# Patient Record
Sex: FEMALE | Race: WHITE | NOT HISPANIC OR LATINO | URBAN - METROPOLITAN AREA
[De-identification: names, ages, dates, MRNs, and addresses within clinical notes are randomized per-mention and may not be internally consistent; named-entity substitution may affect disease eponyms.]

---

## 2023-07-25 ENCOUNTER — OUTPATIENT (OUTPATIENT)
Dept: OUTPATIENT SERVICES | Facility: HOSPITAL | Age: 68
LOS: 1 days | Discharge: HOME | End: 2023-07-25

## 2023-07-25 VITALS
HEART RATE: 86 BPM | DIASTOLIC BLOOD PRESSURE: 85 MMHG | RESPIRATION RATE: 16 BRPM | TEMPERATURE: 98 F | SYSTOLIC BLOOD PRESSURE: 140 MMHG | OXYGEN SATURATION: 98 %

## 2023-07-25 VITALS
SYSTOLIC BLOOD PRESSURE: 134 MMHG | DIASTOLIC BLOOD PRESSURE: 70 MMHG | HEART RATE: 69 BPM | TEMPERATURE: 98 F | RESPIRATION RATE: 16 BRPM

## 2023-07-25 DIAGNOSIS — M23.303 OTHER MENISCUS DERANGEMENTS, UNSPECIFIED MEDIAL MENISCUS, RIGHT KNEE: Chronic | ICD-10-CM

## 2023-07-25 DEVICE — IMPLANTABLE DEVICE: Type: IMPLANTABLE DEVICE | Site: LEFT | Status: FUNCTIONAL

## 2023-07-25 RX ORDER — ONDANSETRON 8 MG/1
4 TABLET, FILM COATED ORAL ONCE
Refills: 0 | Status: DISCONTINUED | OUTPATIENT
Start: 2023-07-25 | End: 2023-07-25

## 2023-07-25 RX ORDER — FENTANYL CITRATE 50 UG/ML
25 INJECTION INTRAVENOUS
Refills: 0 | Status: DISCONTINUED | OUTPATIENT
Start: 2023-07-25 | End: 2023-07-25

## 2023-07-25 RX ORDER — OXYCODONE AND ACETAMINOPHEN 5; 325 MG/1; MG/1
1 TABLET ORAL
Qty: 28 | Refills: 0
Start: 2023-07-25 | End: 2023-07-31

## 2023-07-25 RX ORDER — SODIUM CHLORIDE 9 MG/ML
500 INJECTION, SOLUTION INTRAVENOUS
Refills: 0 | Status: DISCONTINUED | OUTPATIENT
Start: 2023-07-25 | End: 2023-07-25

## 2023-07-25 RX ORDER — ACETAMINOPHEN 500 MG
1000 TABLET ORAL ONCE
Refills: 0 | Status: COMPLETED | OUTPATIENT
Start: 2023-07-25 | End: 2023-07-25

## 2023-07-25 RX ORDER — BUPIVACAINE AND MELOXICAM 400; 12 MG/14ML; MG/14ML
7 SOLUTION INFILTRATION ONCE
Refills: 0 | Status: DISCONTINUED | OUTPATIENT
Start: 2023-07-25 | End: 2023-07-25

## 2023-07-25 RX ADMIN — Medication 400 MILLIGRAM(S): at 13:30

## 2023-07-25 NOTE — BRIEF OPERATIVE NOTE - NSICDXBRIEFPROCEDURE_GEN_ALL_CORE_FT
PROCEDURES:  Modified Oscar bunionectomy of left foot 25-Jul-2023 11:04:47  Luiza Hatch   PROCEDURES:  Modified Oscar bunionectomy of left foot 25-Jul-2023 11:04:47  Luiza Hatch  Correction of hammertoe 25-Jul-2023 11:29:33 left 2nd digit Luiza Hatch

## 2023-07-25 NOTE — ASU DISCHARGE PLAN (ADULT/PEDIATRIC) - CARE PROVIDER_API CALL
Ham Ochoa  Podiatric Medicine and Surgery  [address], [address]  [place], [place] [address]  Phone: [phone]  Fax: [phone]  Established Patient  Follow Up Time: 1 week

## 2023-07-25 NOTE — ASU PREOP CHECKLIST - SITE MARKED BY ANESTHESIOLOGIST
[Well Developed] : well developed [Well Nourished] : well nourished [Normal] : alert, oriented as age-appropriate, affect appropriate; no weakness, no facial asymmetry, moves all extremities normal gait- child older than 18 months n/a

## 2023-07-25 NOTE — BRIEF OPERATIVE NOTE - OPERATION/FINDINGS
INCOMPLETE Left foot Oscar bunionectomy and akin osteotomy, removal of previous akin screw, 2nd digit PIPJ arthroplasty

## 2023-07-25 NOTE — ASU DISCHARGE PLAN (ADULT/PEDIATRIC) - ASU DC SPECIAL INSTRUCTIONSFT
Please keep bandage clean, dry and intact until your first post-operative visit with Dr. Ochoa. Please weight bear as tolerated to your left foot please use the cane to ambulate. Please call Dr. Ochoa's office to confirm your post operative appointment date and time.

## 2023-07-25 NOTE — BRIEF OPERATIVE NOTE - NSICDXBRIEFPOSTOP_GEN_ALL_CORE_FT
POST-OP DIAGNOSIS:  Hav (hallux abducto valgus), left 25-Jul-2023 11:05:02  Walkner, [place]   POST-OP DIAGNOSIS:  Hav (hallux abducto valgus), left 25-Jul-2023 11:05:02  WalknerLuiza of left foot 25-Jul-2023 11:30:02 2nd digit WalknerLuiza

## 2023-07-25 NOTE — BRIEF OPERATIVE NOTE - NSICDXBRIEFPREOP_GEN_ALL_CORE_FT
PRE-OP DIAGNOSIS:  Hav (hallux abducto valgus), left 25-Jul-2023 11:04:57  Walkner, [place]   PRE-OP DIAGNOSIS:  Hav (hallux abducto valgus), left 25-Jul-2023 11:04:57  WalknerLuiza of left foot 25-Jul-2023 11:29:46 2nd digit WalknerLuiza

## (undated) DEVICE — SUT VICRYL 4-0 18" P-2 UNDYED

## (undated) DEVICE — PACK ORTHO FOOT ANKLE

## (undated) DEVICE — SAW BLADE LINVATEC SAGITTAL MIC 9.5X25.5X0.4MM

## (undated) DEVICE — POSITIONER FOAM EGG CRATE ULNAR 2PCS (PINK)

## (undated) DEVICE — DRSG COBAN 3" LF NONSTERILE

## (undated) DEVICE — TOURNIQUET CUFF 18" DUAL PORT SINGLE BLADDER W PLC  (BLACK)

## (undated) DEVICE — SUT MONOCRYL 5-0 18" PS-2

## (undated) DEVICE — GLV 7.5 PROTEXIS (WHITE)

## (undated) DEVICE — MARKING PEN W RULER

## (undated) DEVICE — MICROAIRE PIN CAP 0.045" WHITE

## (undated) DEVICE — DRSG ACE BANDAGE 4"

## (undated) DEVICE — DRSG STOCKINETTE UNDERCAST SYNTHETIC 4"

## (undated) DEVICE — WARMING BLANKET UPPER ADULT

## (undated) DEVICE — BLADE MICRO SAG 16.1X4.5

## (undated) DEVICE — DRSG KLING 4"

## (undated) DEVICE — SLV COMPRESSION KNEE MED

## (undated) DEVICE — DRSG ADAPTIC 3 X 8"

## (undated) DEVICE — SUT VICRYL 3-0 18" PS-2 UNDYED

## (undated) DEVICE — SUT NYLON 5-0 18" R-5

## (undated) DEVICE — DRAPE C ARM MINI PACK FOR 6800